# Patient Record
(demographics unavailable — no encounter records)

---

## 2024-12-30 NOTE — DISCUSSION/SUMMARY
[FreeTextEntry1] : 71-year-old male with left lower extremity subacute limb ischemia status post iliofemoral thrombectomy of the left posterior tibial artery thrombectomy and common femoral to posterior tibial artery bypass with PTFE as well as right popliteal artery embolectomy and 4 compartment fasciotomies and left brachial patch angioplasty with bovine pericardium Doing well states that ischemic rest pain in the left foot is improved does have some gangrenous changes to the tips of the toes he will need to see podiatry.  Continue Xarelto and aspirin A duplex of the left groin was performed in the office today which demonstrates no fluid collection of the groin surrounding the graft.  Apply daily Betadine to the groin incision follow-up in clinic next week to monitor the left groin incision

## 2024-12-30 NOTE — PHYSICAL EXAM
[Respiratory Effort] : normal respiratory effort [Normal Rate and Rhythm] : normal rate and rhythm [Abdomen Tenderness] : ~T ~M No abdominal tenderness [No Rash or Lesion] : No rash or lesion [Alert] : alert [Oriented to Person] : oriented to person [Oriented to Place] : oriented to place [Oriented to Time] : oriented to time [Calm] : calm [de-identified] : No acute distress noted [FreeTextEntry1] : Palpable left posterior tibial artery pulses palpable left radial pulse.  Lower extremity incisions are intact there is fibrinous exudate exudate and superficial breakdown of the incision in the groin at the distal aspect.

## 2024-12-30 NOTE — REASON FOR VISIT
[de-identified] : 71-year-old male presents for postop visit after recent hospitalization where he presented with subacute left lower extremity ischemia with severe ischemic rest pain and gangrenous changes to the left hallux.  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  He underwent thrombolysis catheter placement in the left iliac artery via left brachial artery access.  Then he subsequently underwent left iliofemoral Osmel thrombectomy posterior tibial artery thrombectomy and common femoral to posterior tibial artery bypass with PTFE.  As well as left brachial artery patch angioplasty with bovine pericardium he also suffered right lower extremity acute limb ischemia with a SFA popliteal artery occlusion for which she underwent popliteal artery embolectomy and fasciotomies.  His postop course was complicated by nightly fevers for which she has now completed an antibiotic course no definitive source was ever found.  He is taking DOAC and aspirin.  No fevers or chills at home.  Does report some drainage that is serous from the right lower extremity medial incision.  The groin does have some superficial breakdown at the inferior aspect the daughter has been applying Vaseline to the wound.

## 2025-01-08 NOTE — ASSESSMENT
[FreeTextEntry1] : 71-year-old male with left lower extremity subacute limb ischemia status post iliofemoral thrombectomy of the left posterior tibial artery thrombectomy and common femoral to posterior tibial artery bypass with PTFE as well as right popliteal artery embolectomy and 4 compartment fasciotomies and left brachial patch angioplasty with bovine pericardium Doing well states that ischemic rest pain in the left foot is improved does have some gangrenous changes to the tips of the toes he will need to see podiatry. Continue Xarelto and aspirin  - superficial skin dehiscence to inferior portion of the wound. Does not probe deep. sutures and staples removed from groin. Wound vac applied. Patient will return to vascular clinic on Monday 01/13/25

## 2025-01-08 NOTE — HISTORY OF PRESENT ILLNESS
[FreeTextEntry1] : 71-year-old male presents for postop visit after recent hospitalization where he presented with subacute left lower extremity ischemia with severe ischemic rest pain and gangrenous changes to the left hallux. 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 He underwent thrombolysis catheter placement in the left iliac artery via left brachial artery access. Then he subsequently underwent left iliofemoral Osmel thrombectomy posterior tibial artery thrombectomy and common femoral to posterior tibial artery bypass with PTFE. As well as left brachial artery patch angioplasty with bovine pericardium he also suffered right lower extremity acute limb ischemia with a SFA popliteal artery occlusion for which she underwent popliteal artery embolectomy and fasciotomies. His postop course was complicated by nightly fevers for which she has now completed an antibiotic course no definitive source was ever found. He is taking DOAC and aspirin. No fevers or chills at home. Does report some drainage that is serous from the right lower extremity medial incision. The groin does have some superficial breakdown at the inferior aspect the daughter has been applying Vaseline to the wound. [de-identified] : Patient returns for follow-up regarding left groin wound dehiscence.  He has been applying daily dry dressings at home.  He does have a area of superficial dehiscence with healthy granulation tissue.  Wound does not track when probed with a Q-tip.  No fevers or chills at home no purulence

## 2025-01-08 NOTE — PHYSICAL EXAM
[Respiratory Effort] : normal respiratory effort [Normal Rate and Rhythm] : normal rate and rhythm [No Rash or Lesion] : No rash or lesion [Alert] : alert [Oriented to Person] : oriented to person [Oriented to Place] : oriented to place [Oriented to Time] : oriented to time [Calm] : calm [Abdomen Tenderness] : ~T ~M No abdominal tenderness [de-identified] : No acute distress noted [FreeTextEntry1] : Palpable left posterior tibial artery pulses palpable left radial pulse. left groin wound with inferior aspect superficial dehiscence does not track when probed with a Q-tip.  No fevers or chills at home no purulence

## 2025-01-14 NOTE — PHYSICAL EXAM
[Respiratory Effort] : normal respiratory effort [Normal Rate and Rhythm] : normal rate and rhythm [Abdomen Tenderness] : ~T ~M No abdominal tenderness [Alert] : alert [Oriented to Person] : oriented to person [Oriented to Place] : oriented to place [Oriented to Time] : oriented to time [Calm] : calm [de-identified] : WEll appearing in no distress [FreeTextEntry1] : Palpable left posterior tibial artery pulse, palpable left radial pulse, left groin wound with superficial dehiscence, healthy pink granulation tissue. No purulence.

## 2025-01-14 NOTE — REASON FOR VISIT
[Post Op: _________] : a [unfilled] post op visit [FreeTextEntry1] : F/u after thrombectomy of left lower extremity

## 2025-01-14 NOTE — ASSESSMENT
[FreeTextEntry1] : 71-year-old male with left lower extremity subacute limb ischemia status post iliofemoral thrombectomy of the left posterior tibial artery thrombectomy and common femoral to posterior tibial artery bypass with PTFE as well as right popliteal artery embolectomy and 4 compartment fasciotomies and left brachial patch angioplasty with bovine pericardium.  Arterial duplex completed in the office today. No fluid collection in the groin surrounding the bypass, no hemodynamically significant stenosis Wound appears healthy and progressing as expected. Will apply wet-to dry dressing to wound site.  Patient follow up on Friday 1/17/2025

## 2025-01-14 NOTE — HISTORY OF PRESENT ILLNESS
[FreeTextEntry1] : 71-year-old male presents for postop visit after recent hospitalization where he presented with subacute left lower extremity ischemia with severe ischemic rest pain and gangrenous changes to the left hallux. 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 He underwent thrombolysis catheter placement in the left iliac artery via left brachial artery access. Then he subsequently underwent left iliofemoral Osmel thrombectomy posterior tibial artery thrombectomy and common femoral to posterior tibial artery bypass with PTFE. As well as left brachial artery patch angioplasty with bovine pericardium he also suffered right lower extremity acute limb ischemia with a SFA popliteal artery occlusion for which she underwent popliteal artery embolectomy and fasciotomies. His postop course was complicated by nightly fevers for which she has now completed an antibiotic course no definitive source was ever found. He is taking DOAC and aspirin. No fevers or chills at home. Does report some drainage that is serous from the right lower extremity medial incision. The groin does have some superficial breakdown at the inferior aspect the daughter has been applying Vaseline to the wound. [de-identified] : Patient last seen on 1/8/2025 for follow up regarding left groin wound. Patient had sutures and staples removed from groin wound and wound vac dressing applied. Today, wound vac taken down and wound appears healthy with healthy bleeding granulation tissue. No evidence of erythema, purulence or infection. Denies fever or chills at home.

## 2025-01-14 NOTE — PHYSICAL EXAM
[Respiratory Effort] : normal respiratory effort [Normal Rate and Rhythm] : normal rate and rhythm [Abdomen Tenderness] : ~T ~M No abdominal tenderness [Alert] : alert [Oriented to Person] : oriented to person [Oriented to Place] : oriented to place [Oriented to Time] : oriented to time [Calm] : calm [de-identified] : WEll appearing in no distress [FreeTextEntry1] : Palpable left posterior tibial artery pulse, palpable left radial pulse, left groin wound with superficial dehiscence, healthy pink granulation tissue. No purulence.

## 2025-01-14 NOTE — HISTORY OF PRESENT ILLNESS
[FreeTextEntry1] : 71-year-old male presents for postop visit after recent hospitalization where he presented with subacute left lower extremity ischemia with severe ischemic rest pain and gangrenous changes to the left hallux. 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 He underwent thrombolysis catheter placement in the left iliac artery via left brachial artery access. Then he subsequently underwent left iliofemoral Osmel thrombectomy posterior tibial artery thrombectomy and common femoral to posterior tibial artery bypass with PTFE. As well as left brachial artery patch angioplasty with bovine pericardium he also suffered right lower extremity acute limb ischemia with a SFA popliteal artery occlusion for which she underwent popliteal artery embolectomy and fasciotomies. His postop course was complicated by nightly fevers for which she has now completed an antibiotic course no definitive source was ever found. He is taking DOAC and aspirin. No fevers or chills at home. Does report some drainage that is serous from the right lower extremity medial incision. The groin does have some superficial breakdown at the inferior aspect the daughter has been applying Vaseline to the wound. [de-identified] : Patient last seen on 1/8/2025 for follow up regarding left groin wound. Patient had sutures and staples removed from groin wound and wound vac dressing applied. Today, wound vac taken down and wound appears healthy with healthy bleeding granulation tissue. No evidence of erythema, purulence or infection. Denies fever or chills at home.

## 2025-01-17 NOTE — PHYSICAL EXAM
[Normal Rate and Rhythm] : normal rate and rhythm [Calm] : calm [de-identified] : NAD [de-identified] : normocephalic, small swelling inferior left eyebrow [de-identified] :   normal respiratory effort [FreeTextEntry1] : LLE Triphasic PT signal [de-identified] :  Left groin wound dehiscence with mild yellow drainage, LLE fasciotomy sites clean dry and intact, LLE knee Abrasion, Left great and 3rd tip of toe dry gangrenous

## 2025-01-17 NOTE — ASSESSMENT
[FreeTextEntry1] : 71-year-old male with left lower extremity subacute limb ischemia status post iliofemoral thrombectomy of the left posterior tibial artery thrombectomy and common femoral to posterior tibial artery bypass with PTFE as well as right popliteal artery embolectomy and 4 compartment fasciotomies and left brachial patch angioplasty with bovine pericardium with left groin wound dehiscence.  Patient fell off bed at home yesterday hitting head and LLE and was on the floor for several hours. Left groin wound was soaked in urine possible fecal matter, now with drainage. Positive pedal signals. Patient on Eliquis and aspirin.   Plan Advised patient, daughter and accompanying family members to go to the emergency room. There is concern for left groin wound infection as well as head and knee injury from the fall as patient is currently on DOAC and aspirin and complain of 9/10 pain.  Patient family member agreeable to plan, and verbalized understanding. Dr. Ramsey is aware and agrees with plan. Left groin wound was cleansed and a wet to dry dressing placed.

## 2025-01-17 NOTE — HISTORY OF PRESENT ILLNESS
[FreeTextEntry1] : 71-year-old male presents for postop visit after recent hospitalization where he presented with subacute left lower extremity ischemia with severe ischemic rest pain and gangrenous changes to the left hallux. 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 He underwent thrombolysis catheter placement in the left iliac artery via left brachial artery access. Then he subsequently underwent left iliofemoral Osmel thrombectomy posterior tibial artery thrombectomy and common femoral to posterior tibial artery bypass with PTFE. As well as left brachial artery patch angioplasty with bovine pericardium he also suffered right lower extremity acute limb ischemia with a SFA popliteal artery occlusion for which she underwent popliteal artery embolectomy and fasciotomies. His postop course was complicated by nightly fevers for which she has now completed an antibiotic course no definitive source was ever found. He is taking DOAC and aspirin. No fevers or chills at home. Does report some drainage that is serous from the right lower extremity medial incision. The groin does have some superficial breakdown at the inferior aspect the daughter has been applying Vaseline to the wound.    1/13/25 Interval History: Patient last seen on 1/8/2025 for follow up regarding left groin wound. Patient had sutures and staples removed from groin wound and wound vac dressing applied. Today, wound vac taken down and wound appears healthy with healthy bleeding granulation tissue. No evidence of erythema, purulence or infection. Denies fever or chills at home. [de-identified] : Patient presents today for follow up regarding left groin wound accompanied by his sister and brother. His daughter, Ami, who he lives with was available by phone. She reports the patient fell off the bed yesterday while she was at work and was on the floor for several hours until she got home. She reports working in the city so she was not able to get to him and did not call the ambulance. She reports his left groin incision was covered with urine. She reports she changes the dressing daily but notices yellow drainage that began since Tuesday. Patient reports hitting the left side of his head near the eyebrow and his left knee from the fall. He reports a 9/10 left knee pain and bilateral lower extremity pain. PAtient is currently on daily aspirin and Eliquis 5 mg twice daily. Denies fevers, chills, chest pain and shortness of breath.

## 2025-02-12 NOTE — HISTORY OF PRESENT ILLNESS
[FreeTextEntry1] : 71-year-old male presents for postop visit after recent hospitalization where he presented with subacute left lower extremity ischemia with severe ischemic rest pain and gangrenous changes to the left hallux. 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 He underwent thrombolysis catheter placement in the left iliac artery via left brachial artery access. Then he subsequently underwent left iliofemoral Osmel thrombectomy posterior tibial artery thrombectomy and common femoral to posterior tibial artery bypass with PTFE. As well as left brachial artery patch angioplasty with bovine pericardium he also suffered right lower extremity acute limb ischemia with a SFA popliteal artery occlusion for which she underwent popliteal artery embolectomy and fasciotomies. His postop course was complicated by nightly fevers for which she has now completed an antibiotic course no definitive source was ever found. He is taking DOAC and aspirin. No fevers or chills at home. Does report some drainage that is serous from the right lower extremity medial incision. The groin does have some superficial breakdown at the inferior aspect the daughter has been applying Vaseline to the wound.   1/13/25 Interval History: Patient last seen on 1/8/2025 for follow up regarding left groin wound. Patient had sutures and staples removed from groin wound and wound vac dressing applied. Today, wound vac taken down and wound appears healthy with healthy bleeding granulation tissue. No evidence of erythema, purulence or infection. Denies fever or chills at home.    1/17: Patient presents today for follow up regarding left groin wound accompanied by his sister and brother. His daughter, Ami, who he lives with was available by phone. She reports the patient fell off the bed yesterday while she was at work and was on the floor for several hours until she got home. She reports working in the city so she was not able to get to him and did not call the ambulance. She reports his left groin incision was covered with urine. She reports she changes the dressing daily but notices yellow drainage that began since Tuesday. Patient reports hitting the left side of his head near the eyebrow and his left knee from the fall. He reports a 9/10 left knee pain and bilateral lower extremity pain. Patient is currently on daily aspirin and Eliquis 5 mg twice daily. Denies fevers, chills, chest pain and shortness of breath.  2/12:  Patient presents today for follow up regarding left groin wound accompanied by his sister and brother. His daughter, report some discomfort around the staple line but states his hematoma is slightly better. now s/p LLE great toenail clipping, wound with granulation tissue

## 2025-02-12 NOTE — ASSESSMENT
[FreeTextEntry1] : 71-year-old male with left lower extremity subacute limb ischemia status post iliofemoral thrombectomy of the left posterior tibial artery thrombectomy and common femoral to posterior tibial artery bypass with PTFE as well as right popliteal artery embolectomy and 4 compartment fasciotomies and left brachial patch angioplasty with bovine pericardium with left groin wound dehiscence. Patient fell off bed at home yesterday hitting head and LLE and was on the floor for several hours. Left groin wound was soaked in urine possible fecal matter, now with drainage. Positive pedal signals. Patient on Eliquis and aspirin.  Plan staples removed today, steristrips applied  will need SHERON/PVR pt to follow up in 2-3 weeks

## 2025-02-12 NOTE — REVIEW OF SYSTEMS
[Negative] : Musculoskeletal [FreeTextEntry9] : L groin staples in place no signs of wound dehiscence or infection

## 2025-02-12 NOTE — HISTORY OF PRESENT ILLNESS
[FreeTextEntry1] : The patient is a 71-year-old male with history of peripheral vascular disease who is recently admitted for left groin dehiscence with infected prosthetic graft and was taken to the operating room for exploration, washout and subtotal excision of the left lower extremity bypass graft with immediate rectus femoris muscle flap transposition and closure on 1/19/2025.  He was discharged to rehab facility and then presented to the ER referred by the rehab for thrombocytosis with platelets over million.  At that time he had some worsening swelling in the left thigh and CT imaging revealed a sizable fluid collection consistent with hematoma without any active bleeding or extravasation.  The patient was admitted and considered for washout and closure of the area however he and the family really did not want any more surgery and so he was treated conservatively with warm compresses.  He is on antibiotics through a PICC  line for history of an infected graft and will be on antibiotics through March 4 per his daughter.  Today his daughter translates for him.  He is doing fairly well he does have pain at the left great toe where he had a procedure performed by podiatry yesterday for with nail plate removal and debridement  She says the area of firmness in the groin has been stable without any increasing pain swelling erythema or drainage from the incision which appears well-healed.   He primarily complains about pain in the left foot and distal thigh

## 2025-02-12 NOTE — PLAN
[TextEntry] : Patient is 71-year-old male with PAD with complex surgical history of the left lower extremity including infected bypass graft subtotal excision with rectus femoris coverage of the groin angioplasty site.  This is complicated by postoperative surgical site hematoma that is stable and does not appear infected.  Will continue to monitor in the outpatient setting the groin hematoma especially in the medial groin.  The incision is healed nicely and all stitches and staples were removed today.  Steri-Strips were placed.  Continue warm compresses.  He is on IV antibiotics for culture guided therapy until March 4.  Return to clinic in 2-week

## 2025-02-12 NOTE — PHYSICAL EXAM
[TextEntry] : Physical Exam  General: No acute distress, well-appearing Neuro: Alert and oriented x3 Psych: Appropriate mood and affect HEENT: Normocephalic, atraumatic Respiratory: Breathing comfortably on room air, normal effort and expansion  Left lower extremity: The curvilinear incision is clean dry and intact.  The flap is palpable approximately in this area is fairly soft there is some induration in the medial groin.  There is no erythema warmth or significant tenderness.  The groin is not bulging in the incision is not under tension.  The distal thigh is soft at the donor site except for a small area of subcutaneous induration likely another hematoma.  This area does not appear infected either.  The medial distal thigh incision is clean dry and intact where the prosthesis was stripped from.   left foot is cool and the great toe dressing was removed with absence of the nail plate and pink appearing nailbed with some granulation tissue forming at the tip of the toe

## 2025-02-12 NOTE — PHYSICAL EXAM
[Normal Breath Sounds] : Normal breath sounds [Normal Heart Sounds] : normal heart sounds [No Rash or Lesion] : No rash or lesion [Calm] : calm [JVD] : no jugular venous distention  [Ankle Swelling (On Exam)] : not present [Varicose Veins Of Lower Extremities] : not present [] : not present [Abdomen Tenderness] : ~T ~M No abdominal tenderness [de-identified] : NAD [FreeTextEntry1] : left hallux ulceration, non palpable pedal pulses [de-identified] : L toe with granulation tissue  L groin staples in place c/d/i

## 2025-02-18 NOTE — HISTORY OF PRESENT ILLNESS
[Post-op Sandals] : post-op sandals [FreeTextEntry1] : Presents in the [place] office, patient Went to [facility] for blood clots and has an opening on left hallux. His daughter has been changing dressings every other day, she denies noticing drainage. Patient complains of pain when touching the toe.  Of note, patient has significant hx of left thigh bypass and vascular/plastic surgery Continues IV abx Ertapenem via midline until March 3rd 2025.  HX of left 4th and 5th digit amputation several years ago in [place]. Patient daughter states no changes since his last visit

## 2025-02-18 NOTE — PHYSICAL EXAM
[General Appearance - Alert] : alert [General Appearance - In No Acute Distress] : in no acute distress [Ankle Swelling (On Exam)] : present [Ankle Swelling On The Left] : of the left ankle [Ankle Swelling On The Right] : mild [Delayed in the Left Toes] : capillary refills delayed in the left toes [Sensation] : the sensory exam was normal to light touch and pinprick [Affect] : the affect was normal [Mood] : the mood was normal [Delayed in the Right Toes] : capillary refills normal in right toes [FreeTextEntry3] : delayed cft to left hallux [de-identified] : pain on palpation left hallux distal hx of left partial 4th and 5th ray amputation [FreeTextEntry1] : - skin necrosis to left hallux distal tip, full-thickness subcutaneous layer noted with focal bleeding noted distally and within nail bed/matrix. wound bed measures 4x2x0.2 - 2/11/25 - no purulence, no proximal cellulitis, no malodor - Nails 1-3 left 1-5 right painful, thickened, discolored, dystrophic with subungual debris

## 2025-02-18 NOTE — ASSESSMENT
[Verbal] : verbal [Family member] : family member [Verbalizes knowledge/Understanding] : Verbalizes knowledge/understanding [Dressing changes] : dressing changes [Signs and symptoms of infection] : sign and symptoms of infection [Arterial Disease] : arterial disease [FreeTextEntry1] : Discussed diagnosis and treatment with patient  Discussed etiology of symptoms patient is experiencing  s/p Left hallux total nail avulsion - 2/11/25 nail specimen for pathology - onychomycosis/onychocryptosis Left hallux distal tip necrosis No debridement due to persistent tissue necrosis distal hallux left Dressed with betadine-soaked gauze, DSD Limited WB in surgical shoe  Rx - Wound care supplies from [facility] - 2/11/25 - referred to [facility] Discussed the importance of daily foot exams  Discussed all signs and symptoms of local and regional infection and if they arise patient advised to come into the office or go to the emergency room.  Continue IV Ertapenem per ID recs via midline f/u vascular and plastic surgery--discussed possible left BKA if vascular believes will yield good prognosis. If contraindicated, suggested HBOT vs. topical oxygen therapy Patient to return to the office after vascular f/u [Demo] : demo

## 2025-02-26 NOTE — REASON FOR VISIT
[Family Member] : family member [de-identified] : Exploration of postoperative infection. Removal of left lower extremity femoral to posterior tibial artery portion of PTFE bypass from the groin to the distal thigh with saphenous vein patch angioplasty. Left upper extremity femoral artery thrombectomy.  [de-identified] : 1/19/25 [de-identified] : 71-year-old male s/p removal of left lower extremity femoral to posterior tibial artery portion of PTFE bypass from the groin to the distal thigh with saphenous vein patch angioplasty accompanied by family member, with daughter translating by phone. Patient continues to complain of pain left great and second toe. Continues antibiotics via midline until March.

## 2025-02-26 NOTE — PHYSICAL EXAM
[TextEntry] : Physical Exam  General: No acute distress, well-appearing Neuro: Alert and oriented x3 Psych: Appropriate mood and affect HEENT: Normocephalic, atraumatic Respiratory: Breathing comfortably on room air, normal effort and expansion  Left lower extremity: The left groin thigh incision is completely healed without evidence of infection or new fluid collection.  He continues to soften and localized to an area of induration in the medial groin that continues to improve.  There is no pain along the incision line.  The distal incision is healed as well.  He has near full extension of the knee  He has the foot dangling and complains of pain in the foot.  There are dry eschars on the toes.

## 2025-02-26 NOTE — PHYSICAL EXAM
[Calm] : calm [Alert] : alert [Oriented to Person] : oriented to person [Oriented to Place] : oriented to place [Oriented to Time] : oriented to time [de-identified] : Appears well, in no acute distress. [de-identified] : normocephalic, atraumatic [de-identified] :   normal respiratory effort. unlabored breathing. [de-identified] : Left thigh groin incisions healed, no signs of infection, left hallux gangrene, 4th /5th ray amputation healed

## 2025-02-26 NOTE — HISTORY OF PRESENT ILLNESS
[FreeTextEntry1] : Clinic 2/12/25 The patient is a 71-year-old male with history of peripheral vascular disease who is recently admitted for left groin dehiscence with infected prosthetic graft and was taken to the operating room for exploration, washout and subtotal excision of the left lower extremity bypass graft with immediate rectus femoris muscle flap transposition and closure on 1/19/2025.  He was discharged to rehab facility and then presented to the ER referred by the rehab for thrombocytosis with platelets over million.  At that time he had some worsening swelling in the left thigh and CT imaging revealed a sizable fluid collection consistent with hematoma without any active bleeding or extravasation.  The patient was admitted and considered for washout and closure of the area however he and the family really did not want any more surgery and so he was treated conservatively with warm compresses.  He is on antibiotics through a PICC  line for history of an infected graft and will be on antibiotics through March 4 per his daughter.  Today his daughter translates for him.  He is doing fairly well he does have pain at the left great toe where he had a procedure performed by podiatry yesterday for with nail plate removal and debridement  She says the area of firmness in the groin has been stable without any increasing pain swelling erythema or drainage from the incision which appears well-healed.   He primarily complains about pain in the left foot and distal thigh  Clinic 2/26/2025: The patient returns for routine postop visit.  He complains of severe rest pain in the left lower extremity at the level of the foot and ankle.  Family says that the swelling of the thigh has gone down and he has no pain there.

## 2025-02-26 NOTE — PLAN
[TextEntry] : Patient is 71-year-old male with PAD with complex surgical history of the left lower extremity including infected bypass graft subtotal excision with rectus femoris coverage of the groin angioplasty site.  This is complicated by postoperative surgical site hematoma that is stable and does not appear infected.  The thigh has softened up significantly over the last 2 weeks and there is still localized area of nontender firmness in the medial groin.  Will continue warm compresses and conservative management for the hematoma that is not infected at this time.  I will defer the rest of the care to the vascular surgery team for the lower extremity.  Return to clinic in 4 to 6 weeks to check the thigh.

## 2025-03-03 NOTE — ASSESSMENT
[Verbal] : verbal [Demo] : demo [Family member] : family member [Verbalizes knowledge/Understanding] : Verbalizes knowledge/understanding [Dressing changes] : dressing changes [Signs and symptoms of infection] : sign and symptoms of infection [Arterial Disease] : arterial disease [FreeTextEntry1] : Discussed diagnosis and treatment with patient  Discussed etiology of symptoms patient is experiencing  s/p Left hallux total nail avulsion - 2/11/25 nail specimen for pathology - onychomycosis/onychocryptosis Left hallux distal tip necrosis No debridement due to persistent tissue necrosis distal hallux left Dressed with betadine paint until hyperbaric oxygen therapy is initiated Limited WB in surgical shoe  Rx - Wound care supplies from [facility] - 2/11/25 - referred to [facility] - advised to bring each type next visit Discussed the importance of daily foot exams  Discussed all signs and symptoms of local and regional infection and if they arise patient advised to come into the office or go to the emergency room.  Continue IV Ertapenem per ID recs via midline f/u vascular and plastic surgery Discussed in detail limb salvage treatment for left hallux gangrene distal. Discussed traditional HBOT at [facility] outpatient wound center vs. topical oxygen EO2. Recommended to establish care at [facility] while looking into EO2 if it is possible for convenience Patient to return to the office in 2 weeks

## 2025-03-03 NOTE — HISTORY OF PRESENT ILLNESS
[de-identified] : Patient is a 71 M with PMHx HTN, HLD, LLE limb ischemia referred for thrombocytosis, anemia.   Patient recently hospitalized at [facility], after presenting to the ER from [facility] due to thrombocytosis. Patient known to vascular service, initially had LLE limb ischemia requiring LLE thrombolysis followed by embolectomy and fem-PT bypass with PTFE, developed RLE limb ischemia requiring embolectomy and RLE fasciotomies, and most recently admitted with infection of L groin, s/p L groin washout with removal of bypass and vein patch repair of CFA and rectus femoris flap transposition and closure on 1/19. Patient initially with drains, likely clotted off and removed prior to discharge on 1/27. Found to have a slightly elevated WBC and was admitted with IV antibiotics. Imaging on admission demonstrated a collection of his left groin that was stable on imaging on day of discharge. ID was consulted and recommended IV abx until 3/3 and a midline was placed.   Today, patient is status quo per daughter. No signs of bleeding. No thrombosis events. On Eliquis. Off antibiotics.

## 2025-03-03 NOTE — HISTORY OF PRESENT ILLNESS
[de-identified] : Patient is a 71 M with PMHx HTN, HLD, LLE limb ischemia referred for thrombocytosis, anemia.   Patient recently hospitalized at [facility], after presenting to the ER from [facility] due to thrombocytosis. Patient known to vascular service, initially had LLE limb ischemia requiring LLE thrombolysis followed by embolectomy and fem-PT bypass with PTFE, developed RLE limb ischemia requiring embolectomy and RLE fasciotomies, and most recently admitted with infection of L groin, s/p L groin washout with removal of bypass and vein patch repair of CFA and rectus femoris flap transposition and closure on 1/19. Patient initially with drains, likely clotted off and removed prior to discharge on 1/27. Found to have a slightly elevated WBC and was admitted with IV antibiotics. Imaging on admission demonstrated a collection of his left groin that was stable on imaging on day of discharge. ID was consulted and recommended IV abx until 3/3 and a midline was placed.   Today, patient is status quo per daughter. No signs of bleeding. No thrombosis events. On Eliquis. Off antibiotics.

## 2025-03-03 NOTE — PHYSICAL EXAM
[General Appearance - Alert] : alert [General Appearance - In No Acute Distress] : in no acute distress [Ankle Swelling (On Exam)] : present [Ankle Swelling On The Left] : of the left ankle [Ankle Swelling On The Right] : mild [Delayed in the Left Toes] : capillary refills delayed in the left toes [Sensation] : the sensory exam was normal to light touch and pinprick [Affect] : the affect was normal [Mood] : the mood was normal [Delayed in the Right Toes] : capillary refills normal in right toes [FreeTextEntry3] : delayed cft to left hallux [de-identified] : pain on palpation left hallux distal hx of 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

## 2025-03-03 NOTE — HISTORY OF PRESENT ILLNESS
[de-identified] : Patient is a 71 M with PMHx HTN, HLD, LLE limb ischemia referred for thrombocytosis, anemia.   Patient recently hospitalized at [facility], after presenting to the ER from [facility] due to thrombocytosis. Patient known to vascular service, initially had LLE limb ischemia requiring LLE thrombolysis followed by embolectomy and fem-PT bypass with PTFE, developed RLE limb ischemia requiring embolectomy and RLE fasciotomies, and most recently admitted with infection of L groin, s/p L groin washout with removal of bypass and vein patch repair of CFA and rectus femoris flap transposition and closure on 1/19. Patient initially with drains, likely clotted off and removed prior to discharge on 1/27. Found to have a slightly elevated WBC and was admitted with IV antibiotics. Imaging on admission demonstrated a collection of his left groin that was stable on imaging on day of discharge. ID was consulted and recommended IV abx until 3/3 and a midline was placed.   Today, patient is status quo per daughter. No signs of bleeding. No thrombosis events. On Eliquis. Off antibiotics.

## 2025-03-03 NOTE — HISTORY OF PRESENT ILLNESS
[Post-op Sandals] : post-op sandals [FreeTextEntry1] : Presents in the [place] office, patient Went to [facility] for blood clots and has an opening on left hallux. His daughter has been changing dressings every other day, she denies noticing drainage. Patient complains of pain when touching the toe.  Of note, patient has significant hx of left thigh bypass and vascular/plastic surgery Continues IV abx Ertapenem via midline until March 3rd 2025.  HX of left 4th and 5th digit amputation several years ago in [place]. Patient daughter states no changes since his last visit

## 2025-03-03 NOTE — ASSESSMENT
[FreeTextEntry1] : Patient is a 71 M with PMHx HTN, HLD, LLE limb ischemia referred for thrombocytosis, anemia.   Plan: - Thrombocytosis likely secondary to chronic ulcer of left toe, recurrent infection, likely iron deficiency anemia - Given high platelet count, will evaluate for myeloproliferative disorder with CURTIS-2, MPL, CALR,BCR/ABL, flow cytometry as well as anemia work-up - If iron deficient, will avoid IV iron at this time given recurrent infections. Will opt for oral iron supplementation  Addendum: Patient iron deficient, will send ferrous sulfate 325 mcg once daily
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
## 2025-03-12 NOTE — PHYSICAL EXAM
[Abdomen Tenderness] : ~T ~M No abdominal tenderness [Alert] : alert [Oriented to Person] : oriented to person [Oriented to Place] : oriented to place [Oriented to Time] : oriented to time [Calm] : calm [de-identified] : Appears well, in no acute distress. [de-identified] : normocephalic, atraumatic [de-identified] :   normal respiratory effort. unlabored breathing. [FreeTextEntry1] : Posterior tibial artery signal is present [de-identified] : Left thigh groin incisions healed, no signs of infection, left hallux gangrene, 4th /5th ray amputation healed

## 2025-03-18 NOTE — HISTORY OF PRESENT ILLNESS
[Post-op Sandals] : post-op sandals [FreeTextEntry1] : Presents in the [place] office, patient Went to [facility] for blood clots and has an opening on left hallux. Of note, patient has significant hx of left thigh bypass and vascular/plastic surgery Completed IV abx Ertapenem  HX of left 4th and 5th digit amputation several years ago in [place]. Patient presents with surgical shoes and in wheel chair. Dressings are being changed daily. Presents with his son

## 2025-03-18 NOTE — REASON FOR VISIT
[Follow-Up Visit] : a follow-up visit for [Other: _____] : [unfilled] [FreeTextEntry2] : left hallux ischemic wound

## 2025-03-18 NOTE — ASSESSMENT
[Verbal] : verbal [Demo] : demo [Family member] : family member [Verbalizes knowledge/Understanding] : Verbalizes knowledge/understanding [Dressing changes] : dressing changes [Signs and symptoms of infection] : sign and symptoms of infection [Arterial Disease] : arterial disease [FreeTextEntry1] : Discussed diagnosis and treatment with patient  Discussed etiology of symptoms patient is experiencing  s/p Left hallux total nail avulsion - 2/11/25 nail specimen for pathology - onychomycosis/onychocryptosis Left hallux distal tip necrosis No debridement due to persistent tissue necrosis distal hallux left Dressed with betadine paint until hyperbaric oxygen therapy is initiated Rx betadine Limited WB in surgical shoe  Rx - Wound care supplies from [facility] - 2/11/25 - referred to [facility] - advised to bring each type next visit Discussed the importance of daily foot exams  Discussed all signs and symptoms of local and regional infection and if they arise patient advised to come into the office or go to the emergency room.  Continue IV Ertapenem per ID recs via midline f/u vascular and plastic surgery Discussed in detail limb salvage treatment for left hallux gangrene distal. Discussed traditional HBOT at [facility] outpatient wound center vs. topical oxygen EO2. Recommended to establish care at [facility] while looking into EO2 if it is possible for convenience - as of 3/18/25, patient has appointment at [facility] 3/19. f/u Obtained/reviewed left foot xrays 3 views WB 3/18/25: no obvious cortical erosion noted at 1st distal phalanx. no soft tissue emphysema Patient to return to the office after f/u at  and home oxygen

## 2025-03-18 NOTE — PHYSICAL EXAM
[General Appearance - Alert] : alert [General Appearance - In No Acute Distress] : in no acute distress [Ankle Swelling (On Exam)] : present [Ankle Swelling On The Left] : of the left ankle [Ankle Swelling On The Right] : mild [Delayed in the Left Toes] : capillary refills delayed in the left toes [Sensation] : the sensory exam was normal to light touch and pinprick [Affect] : the affect was normal [Mood] : the mood was normal [Delayed in the Right Toes] : capillary refills normal in right toes [FreeTextEntry3] : delayed cft to left hallux [de-identified] : pain on palpation left hallux distal hx of left partial 4th and 5th ray amputation [FreeTextEntry1] : - skin necrosis to left hallux distal tip, mostly dry eschar - focal liquefactive necrosis, no proximal cellulitis, no malodor - Nails 2-3 left, 1-5 right painful, thickened, discolored, dystrophic with subungual debris

## 2025-03-27 NOTE — ASSESSMENT
[FreeTextEntry1] : Patient is a 71 M with PMHx HTN, HLD, LLE limb ischemia referred for thrombocytosis, anemia, found to have iron deficiency anemia and CURTIS-2 Positive Essential Thrombocytosis.   Plan: - CBC today: Hgb 14.1, HCT 45.7%, PLT 492K. Improved - Continue ferrous sulfate 325 mcg once daily - Continue Hydroxyurea 500 mg once daily - Patient on Aspirin 81 mg and Eliquis 5 mg BID. If any to be D/C, prefer patient remains on Eliquis due to history of blood clots.

## 2025-03-27 NOTE — HISTORY OF PRESENT ILLNESS
[de-identified] : Patient is a 72 M with PMHx HTN, HLD, LLE limb ischemia referred for thrombocytosis, anemia.   Patient recently hospitalized at [facility], after presenting to the ER from [facility] due to thrombocytosis. Patient known to vascular service, initially had LLE limb ischemia requiring LLE thrombolysis followed by embolectomy and fem-PT bypass with PTFE, developed RLE limb ischemia requiring embolectomy and RLE fasciotomies, and most recently admitted with infection of L groin, s/p L groin washout with removal of bypass and vein patch repair of CFA and rectus femoris flap transposition and closure on 1/19. Patient initially with drains, likely clotted off and removed prior to discharge on 1/27. Found to have a slightly elevated WBC and was admitted with IV antibiotics. Imaging on admission demonstrated a collection of his left groin that was stable on imaging on day of discharge. ID was consulted and recommended IV abx until 3/3 and a midline was placed.   Today, patient is status quo per daughter. No signs of bleeding. No thrombosis events. On Eliquis. Off antibiotics.   Interval History: Found to be iron deficient, started on ferrous sulfate 325 mcg. Also CURTIS-2 Positive Essential Thrombocytosis, started on Hydrea 500 mg once daily. Tolerating both well. On Aspirin and Eliquis.

## 2025-04-04 NOTE — HISTORY OF PRESENT ILLNESS
[FreeTextEntry1] : Clinic 2/12/25 The patient is a 71-year-old male with history of peripheral vascular disease who is recently admitted for left groin dehiscence with infected prosthetic graft and was taken to the operating room for exploration, washout and subtotal excision of the left lower extremity bypass graft with immediate rectus femoris muscle flap transposition and closure on 1/19/2025.  He was discharged to rehab facility and then presented to the ER referred by the rehab for thrombocytosis with platelets over million.  At that time he had some worsening swelling in the left thigh and CT imaging revealed a sizable fluid collection consistent with hematoma without any active bleeding or extravasation.  The patient was admitted and considered for washout and closure of the area however he and the family really did not want any more surgery and so he was treated conservatively with warm compresses.  He is on antibiotics through a PICC  line for history of an infected graft and will be on antibiotics through March 4 per his daughter.  Today his daughter translates for him.  He is doing fairly well he does have pain at the left great toe where he had a procedure performed by podiatry yesterday for with nail plate removal and debridement  She says the area of firmness in the groin has been stable without any increasing pain swelling erythema or drainage from the incision which appears well-healed.   He primarily complains about pain in the left foot and distal thigh  Clinic 2/26/2025: The patient returns for routine postop visit.  He complains of severe rest pain in the left lower extremity at the level of the foot and ankle.  Family says that the swelling of the thigh has gone down and he has no pain there.  Clinic 4/4/25: Patient is doing ok today.  He continues to have rest pain and has a wound on the left great toe for which he has an oxygen delivery machine and bandage for.  Family says the left thigh has softened significantly.

## 2025-04-04 NOTE — PLAN
[TextEntry] : Patient is 71-year-old male with PAD with complex surgical history of the left lower extremity including infected bypass graft subtotal excision with rectus femoris coverage of the groin angioplasty site.  This is complicated by postoperative surgical site hematoma that was managed conservatively with warm compresses and massage.  Thigh  hematoma has resolved with sequlae. Scars are healed and soft.  RTC 6 months

## 2025-04-04 NOTE — PHYSICAL EXAM
[TextEntry] : Physical Exam  General: No acute distress, well-appearing Neuro: Alert and oriented x3 Psych: Appropriate mood and affect HEENT: Normocephalic, atraumatic Respiratory: Breathing comfortably on room air, normal effort and expansion  Left lower extremity: The left groin thigh incision is completely healed without evidence of infection or new fluid collection.  The entire thigh is soft and non-tender.  He has full extension of the knee  Oxygen delivery machine around calf with tubing connected to dressing on the foot

## 2025-05-01 NOTE — ASSESSMENT
[FreeTextEntry1] : 72-year-old male with complicated course of recent acute limb ischemia requiring extensive embolectomy as well as Mariscal to posterior tibial artery bypass with PTFE which became infected and required explantation with vein patch in the common femoral artery on the left as well as rectus femoris flap with plastics  Patient remains with ischemic rest pain of the LLE and minimal gangrene. Will try to order art assist for ischemic rest pain. Return to vascular clinic in 3 months

## 2025-05-01 NOTE — HISTORY OF PRESENT ILLNESS
[FreeTextEntry1] : 71-year-old male presents for postop visit after recent hospitalization where he presented with subacute left lower extremity ischemia with severe ischemic rest pain and gangrenous changes to the left hallux. 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 He underwent thrombolysis catheter placement in the left iliac artery via left brachial artery access. Then he subsequently underwent left iliofemoral Osmel thrombectomy posterior tibial artery thrombectomy and common femoral to posterior tibial artery bypass with PTFE. As well as left brachial artery patch angioplasty with bovine pericardium he also suffered right lower extremity acute limb ischemia with a SFA popliteal artery occlusion for which she underwent popliteal artery embolectomy and fasciotomies. His postop course was complicated by nightly fevers for which she has now completed an antibiotic course no definitive source was ever found. He is taking DOAC and aspirin. No fevers or chills at home. Does report some drainage that is serous from the right lower extremity medial incision. The groin does have some superficial breakdown at the inferior aspect the daughter has been applying Vaseline to the wound. [de-identified] : Patient returns for follow up visit for L foot critical limb ischemia. His L hallux has stable gangrene at the tip. He also has severe ischemic rest pain.

## 2025-05-12 NOTE — PHYSICAL EXAM
[Normal] : affect appropriate [Ulcers] : no ulcers [Mucositis] : no mucositis [de-identified] : EOM grossly intact [de-identified] : supple [de-identified] : trace edema [de-identified] : missing digits on left foot, big toe dressed for wound

## 2025-05-12 NOTE — HISTORY OF PRESENT ILLNESS
[de-identified] : Patient is a 72 M with PMHx HTN, HLD, LLE limb ischemia referred for thrombocytosis, anemia, found to have ET.  Patient recently hospitalized at [facility], after presenting to the ER from [facility] due to thrombocytosis. Patient known to vascular service, initially had LLE limb ischemia requiring LLE thrombolysis followed by embolectomy and fem-PT bypass with PTFE, developed RLE limb ischemia requiring embolectomy and RLE fasciotomies, and most recently admitted with infection of L groin, s/p L groin washout with removal of bypass and vein patch repair of CFA and rectus femoris flap transposition and closure on 1/19. Patient initially with drains, likely clotted off and removed prior to discharge on 1/27. Found to have a slightly elevated WBC and was admitted with IV antibiotics. Imaging on admission demonstrated a collection of his left groin that was stable on imaging on day of discharge. ID was consulted and recommended IV abx until 3/3 and a midline was placed.   No signs of bleeding. No thrombosis events. On Eliquis. Off antibiotics.   Found to be iron deficient, started on ferrous sulfate 325 mcg. Also CURTIS-2 Positive Essential Thrombocytosis, started on Hydrea 500 mg once daily. Tolerating both well. On Aspirin and Eliquis.  [de-identified] : Patient reports he has been compliant with ASA, Eliquis, hydroxyurea 500 daily.  Endorses diarrhea for 4 days, loose stools that are mixed. Going 4-5 times per day. He is able to eat and drink. Denies nausea or vomiting.  Uses a wheelchair.  Feeling weak in general.  Denies fevers, chills, LAD, night sweats, unintentional weight loss. Denies chest pain and abdominal pain. Denies black/dark/bloody stools.

## 2025-05-12 NOTE — PHYSICAL EXAM
[Normal] : affect appropriate [Ulcers] : no ulcers [Mucositis] : no mucositis [de-identified] : EOM grossly intact [de-identified] : supple [de-identified] : trace edema [de-identified] : missing digits on left foot, big toe dressed for wound

## 2025-05-12 NOTE — ASSESSMENT
[FreeTextEntry1] : Patient is a 72 M with PMHx HTN, HLD, LLE limb ischemia referred for thrombocytosis, anemia, found to have iron deficiency anemia and CURTIS-2 Positive Essential Thrombocytosis.   Essential thrombocythemia (ET) is a BCR::ABL1-negative myeloproliferative neoplasm (MPN) characterized by excessive clonal platelet production. While at least one-half of patients with ET are asymptomatic at the time of diagnosis, most will develop vasomotor, thrombotic, or hemorrhagic manifestations at some point during their disease. A small percentage of patients will progress to post-ET myelofibrosis or experience leukemic transformation. Nevertheless, ET has the most favorable prognosis among the BCR::ABL1 MPNs.  Patient can stop taking ferrous sulfate. Ferritin last 52, no anemia, patient with MPN.   Platelets improving on hydrea (previously in the 900s now in the 500s). Increased Hydrea dosing to 1000mg daily starting today. New rx sent.  Patient on Aspirin 81 mg daily and Eliquis 5 mg BID.  If any to be discontinued, prefer patient remains on Eliquis due to history of blood clots.   Patient with left big toe pain. Dressing in place. Last seen by podiatry in March. Getting hyperbaric oxygen treatments. Our team is assisting in getting the patient a follow up with Dr. Burks due to the left big toe pain - made for 5/19/25. Patient had previously been referred to wound care but did not follow up.  Patient denies fevers, chills, low blood pressure. He does have some diarrhea. The patient was instructed that he must report to the hospital if he develops fevers, chills, inability to keep down food/fluids by mouth, low blood pressure, or worsening left foot pain or diarrhea.  Continue follow up with vascular.   CBC in 4 weeks. The patient will follow up in 2 months in the office.

## 2025-05-12 NOTE — REVIEW OF SYSTEMS
[Diarrhea: Grade 0] : Diarrhea: Grade 0 [Negative] : Allergic/Immunologic [Fatigue] : fatigue [Muscle Pain] : muscle pain [Muscle Weakness] : muscle weakness [Fever] : no fever [Chills] : no chills [Night Sweats] : no night sweats [Recent Change In Weight] : ~T no recent weight change [Abdominal Pain] : no abdominal pain [Vomiting] : no vomiting [Constipation] : no constipation [Joint Pain] : no joint pain [Joint Stiffness] : no joint stiffness [FreeTextEntry7] : +loose stools [FreeTextEntry9] : +leg pains seeing vascular, left toe pain sees podiatry

## 2025-05-12 NOTE — HISTORY OF PRESENT ILLNESS
[de-identified] : Patient is a 72 M with PMHx HTN, HLD, LLE limb ischemia referred for thrombocytosis, anemia, found to have ET.  Patient recently hospitalized at [facility], after presenting to the ER from [facility] due to thrombocytosis. Patient known to vascular service, initially had LLE limb ischemia requiring LLE thrombolysis followed by embolectomy and fem-PT bypass with PTFE, developed RLE limb ischemia requiring embolectomy and RLE fasciotomies, and most recently admitted with infection of L groin, s/p L groin washout with removal of bypass and vein patch repair of CFA and rectus femoris flap transposition and closure on 1/19. Patient initially with drains, likely clotted off and removed prior to discharge on 1/27. Found to have a slightly elevated WBC and was admitted with IV antibiotics. Imaging on admission demonstrated a collection of his left groin that was stable on imaging on day of discharge. ID was consulted and recommended IV abx until 3/3 and a midline was placed.   No signs of bleeding. No thrombosis events. On Eliquis. Off antibiotics.   Found to be iron deficient, started on ferrous sulfate 325 mcg. Also CURTIS-2 Positive Essential Thrombocytosis, started on Hydrea 500 mg once daily. Tolerating both well. On Aspirin and Eliquis.  [de-identified] : Patient reports he has been compliant with ASA, Eliquis, hydroxyurea 500 daily.  Endorses diarrhea for 4 days, loose stools that are mixed. Going 4-5 times per day. He is able to eat and drink. Denies nausea or vomiting.  Uses a wheelchair.  Feeling weak in general.  Denies fevers, chills, LAD, night sweats, unintentional weight loss. Denies chest pain and abdominal pain. Denies black/dark/bloody stools.

## 2025-05-12 NOTE — PHYSICAL EXAM
[Normal] : affect appropriate [Ulcers] : no ulcers [Mucositis] : no mucositis [de-identified] : EOM grossly intact [de-identified] : supple [de-identified] : trace edema [de-identified] : missing digits on left foot, big toe dressed for wound

## 2025-05-12 NOTE — HISTORY OF PRESENT ILLNESS
[de-identified] : Patient is a 72 M with PMHx HTN, HLD, LLE limb ischemia referred for thrombocytosis, anemia, found to have ET.  Patient recently hospitalized at [facility], after presenting to the ER from [facility] due to thrombocytosis. Patient known to vascular service, initially had LLE limb ischemia requiring LLE thrombolysis followed by embolectomy and fem-PT bypass with PTFE, developed RLE limb ischemia requiring embolectomy and RLE fasciotomies, and most recently admitted with infection of L groin, s/p L groin washout with removal of bypass and vein patch repair of CFA and rectus femoris flap transposition and closure on 1/19. Patient initially with drains, likely clotted off and removed prior to discharge on 1/27. Found to have a slightly elevated WBC and was admitted with IV antibiotics. Imaging on admission demonstrated a collection of his left groin that was stable on imaging on day of discharge. ID was consulted and recommended IV abx until 3/3 and a midline was placed.   No signs of bleeding. No thrombosis events. On Eliquis. Off antibiotics.   Found to be iron deficient, started on ferrous sulfate 325 mcg. Also CURTIS-2 Positive Essential Thrombocytosis, started on Hydrea 500 mg once daily. Tolerating both well. On Aspirin and Eliquis.  [de-identified] : Patient reports he has been compliant with ASA, Eliquis, hydroxyurea 500 daily.  Endorses diarrhea for 4 days, loose stools that are mixed. Going 4-5 times per day. He is able to eat and drink. Denies nausea or vomiting.  Uses a wheelchair.  Feeling weak in general.  Denies fevers, chills, LAD, night sweats, unintentional weight loss. Denies chest pain and abdominal pain. Denies black/dark/bloody stools.

## 2025-07-22 NOTE — REVIEW OF SYSTEMS
[Fatigue] : fatigue [Muscle Pain] : muscle pain [Muscle Weakness] : muscle weakness [Negative] : Allergic/Immunologic [FreeTextEntry7] : +loose stools [Fever] : no fever [Chills] : no chills [Night Sweats] : no night sweats [Recent Change In Weight] : ~T no recent weight change [Abdominal Pain] : no abdominal pain [Vomiting] : no vomiting [Constipation] : no constipation [Joint Pain] : no joint pain [Joint Stiffness] : no joint stiffness [FreeTextEntry9] : +leg pains seeing vascular, left toe pain sees podiatry

## 2025-07-22 NOTE — HISTORY OF PRESENT ILLNESS
[de-identified] : Patient is a 72 M with PMHx HTN, HLD, LLE limb ischemia referred for thrombocytosis, anemia, found to have ET.  Patient recently hospitalized at [facility], after presenting to the ER from [facility] due to thrombocytosis. Patient known to vascular service, initially had LLE limb ischemia requiring LLE thrombolysis followed by embolectomy and fem-PT bypass with PTFE, developed RLE limb ischemia requiring embolectomy and RLE fasciotomies, and most recently admitted with infection of L groin, s/p L groin washout with removal of bypass and vein patch repair of CFA and rectus femoris flap transposition and closure on 1/19. Patient initially with drains, likely clotted off and removed prior to discharge on 1/27. Found to have a slightly elevated WBC and was admitted with IV antibiotics. Imaging on admission demonstrated a collection of his left groin that was stable on imaging on day of discharge. ID was consulted and recommended IV abx until 3/3 and a midline was placed.   No signs of bleeding. No thrombosis events. On Eliquis. Off antibiotics.   Found to be iron deficient, started on ferrous sulfate 325 mcg. Also CURTIS-2 Positive Essential Thrombocytosis, started on Hydrea 500 mg once daily. Tolerating both well. On Aspirin and Eliquis.  [de-identified] : He is here with a family member.  Overall clinically better.  Ambulating now.  Hyperbaric oxygen stopped 2 weeks ago.  Remains on ASA, Eliquis, hydroxyurea 500 daily.  No bleeding.

## 2025-07-22 NOTE — HISTORY OF PRESENT ILLNESS
[de-identified] : Patient is a 72 M with PMHx HTN, HLD, LLE limb ischemia referred for thrombocytosis, anemia, found to have ET.  Patient recently hospitalized at [facility], after presenting to the ER from [facility] due to thrombocytosis. Patient known to vascular service, initially had LLE limb ischemia requiring LLE thrombolysis followed by embolectomy and fem-PT bypass with PTFE, developed RLE limb ischemia requiring embolectomy and RLE fasciotomies, and most recently admitted with infection of L groin, s/p L groin washout with removal of bypass and vein patch repair of CFA and rectus femoris flap transposition and closure on 1/19. Patient initially with drains, likely clotted off and removed prior to discharge on 1/27. Found to have a slightly elevated WBC and was admitted with IV antibiotics. Imaging on admission demonstrated a collection of his left groin that was stable on imaging on day of discharge. ID was consulted and recommended IV abx until 3/3 and a midline was placed.   No signs of bleeding. No thrombosis events. On Eliquis. Off antibiotics.   Found to be iron deficient, started on ferrous sulfate 325 mcg. Also CURTIS-2 Positive Essential Thrombocytosis, started on Hydrea 500 mg once daily. Tolerating both well. On Aspirin and Eliquis.  [de-identified] : He is here with a family member.  Overall clinically better.  Ambulating now.  Hyperbaric oxygen stopped 2 weeks ago.  Remains on ASA, Eliquis, hydroxyurea 500 daily.  No bleeding.

## 2025-07-22 NOTE — ASSESSMENT
[FreeTextEntry1] : Patient is a 72 M with PMHx HTN, HLD, LLE limb ischemia referred for thrombocytosis.  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  CALR/MPL negative.   Uncertain if his underlying limb ischemia is related to or exacerbated by his underlying ET.  He has high risk as his is >60 with a Jak2 mutation.  Bone marrow biopsy is usually required to ensure no other causes of thrombocytosis such as MF.   Platelets improving on hydrea (previously in the 900s now in the 500s). Currently on Hydrea 1000mg daily- plt [id number]. Increased hydrea to 1000mg/alternating 1500mg.   Patient on Aspirin 81 mg daily and Eliquis 5 mg BID.   Continue follow up with vascular/podiatry.   CBC in 4 weeks. The patient will follow up in 2 months in the office.

## 2025-07-22 NOTE — PHYSICAL EXAM
[Normal] : affect appropriate [Ulcers] : no ulcers [Mucositis] : no mucositis [de-identified] : EOM grossly intact [de-identified] : supple [de-identified] : trace edema [de-identified] : missing digits on left foot, big toe dressed for wound

## 2025-07-22 NOTE — PHYSICAL EXAM
[Normal] : affect appropriate [Ulcers] : no ulcers [Mucositis] : no mucositis [de-identified] : EOM grossly intact [de-identified] : supple [de-identified] : trace edema [de-identified] : missing digits on left foot, big toe dressed for wound